# Patient Record
Sex: MALE | Race: WHITE
[De-identification: names, ages, dates, MRNs, and addresses within clinical notes are randomized per-mention and may not be internally consistent; named-entity substitution may affect disease eponyms.]

---

## 2017-05-05 ENCOUNTER — HOSPITAL ENCOUNTER (EMERGENCY)
Dept: HOSPITAL 8 - ED | Age: 52
Discharge: HOME | End: 2017-05-05
Payer: COMMERCIAL

## 2017-05-05 VITALS — HEIGHT: 68 IN | BODY MASS INDEX: 35.52 KG/M2 | WEIGHT: 234.35 LBS

## 2017-05-05 VITALS — DIASTOLIC BLOOD PRESSURE: 84 MMHG | SYSTOLIC BLOOD PRESSURE: 132 MMHG

## 2017-05-05 DIAGNOSIS — R10.9: ICD-10-CM

## 2017-05-05 DIAGNOSIS — I10: ICD-10-CM

## 2017-05-05 DIAGNOSIS — N20.1: Primary | ICD-10-CM

## 2017-05-05 LAB — BUN SERPL-MCNC: 15 MG/DL (ref 7–18)

## 2017-05-05 PROCEDURE — 99285 EMERGENCY DEPT VISIT HI MDM: CPT

## 2017-05-05 PROCEDURE — 96374 THER/PROPH/DIAG INJ IV PUSH: CPT

## 2017-05-05 PROCEDURE — 85025 COMPLETE CBC W/AUTO DIFF WBC: CPT

## 2017-05-05 PROCEDURE — 82040 ASSAY OF SERUM ALBUMIN: CPT

## 2017-05-05 PROCEDURE — 96375 TX/PRO/DX INJ NEW DRUG ADDON: CPT

## 2017-05-05 PROCEDURE — 36415 COLL VENOUS BLD VENIPUNCTURE: CPT

## 2017-05-05 PROCEDURE — 74176 CT ABD & PELVIS W/O CONTRAST: CPT

## 2017-05-05 PROCEDURE — 80048 BASIC METABOLIC PNL TOTAL CA: CPT

## 2017-05-05 PROCEDURE — 96372 THER/PROPH/DIAG INJ SC/IM: CPT

## 2017-05-05 PROCEDURE — 81001 URINALYSIS AUTO W/SCOPE: CPT

## 2020-01-15 ENCOUNTER — HOSPITAL ENCOUNTER (OUTPATIENT)
Dept: HOSPITAL 8 - RAD | Age: 55
Discharge: HOME | End: 2020-01-15
Attending: FAMILY MEDICINE
Payer: COMMERCIAL

## 2020-01-15 DIAGNOSIS — K57.30: ICD-10-CM

## 2020-01-15 DIAGNOSIS — R10.84: ICD-10-CM

## 2020-01-15 DIAGNOSIS — M51.36: ICD-10-CM

## 2020-01-15 DIAGNOSIS — I70.0: ICD-10-CM

## 2020-01-15 DIAGNOSIS — K80.20: ICD-10-CM

## 2020-01-15 DIAGNOSIS — K42.9: Primary | ICD-10-CM

## 2020-01-15 DIAGNOSIS — K44.9: ICD-10-CM

## 2020-01-15 DIAGNOSIS — K76.0: ICD-10-CM

## 2020-01-15 LAB — CREAT SERPL-MCNC: 1.04 MG/DL (ref 0.7–1.3)

## 2020-01-15 PROCEDURE — 82565 ASSAY OF CREATININE: CPT

## 2020-01-15 PROCEDURE — 36415 COLL VENOUS BLD VENIPUNCTURE: CPT

## 2020-01-15 PROCEDURE — 74177 CT ABD & PELVIS W/CONTRAST: CPT

## 2021-06-01 ENCOUNTER — HOSPITAL ENCOUNTER (EMERGENCY)
Dept: HOSPITAL 8 - ED | Age: 56
Discharge: HOME | End: 2021-06-01
Payer: COMMERCIAL

## 2021-06-01 VITALS — BODY MASS INDEX: 34.35 KG/M2 | HEIGHT: 68 IN | WEIGHT: 226.64 LBS

## 2021-06-01 VITALS — SYSTOLIC BLOOD PRESSURE: 124 MMHG | DIASTOLIC BLOOD PRESSURE: 77 MMHG

## 2021-06-01 DIAGNOSIS — T18.128A: Primary | ICD-10-CM
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
## 2021-06-01 PROCEDURE — 74220 X-RAY XM ESOPHAGUS 1CNTRST: CPT

## 2021-06-01 PROCEDURE — 96374 THER/PROPH/DIAG INJ IV PUSH: CPT

## 2021-06-01 PROCEDURE — C9113 INJ PANTOPRAZOLE SODIUM, VIA: HCPCS

## 2021-06-01 PROCEDURE — 96375 TX/PRO/DX INJ NEW DRUG ADDON: CPT

## 2021-06-01 PROCEDURE — 99285 EMERGENCY DEPT VISIT HI MDM: CPT

## 2021-06-01 PROCEDURE — 43247 EGD REMOVE FOREIGN BODY: CPT

## 2021-06-01 NOTE — NUR
PT C/O OF DIFFICULTY SWALLOWING AROUND 1530 TODAY. PT STATES HE FEELS LIKE FOOD 
IS CAUGHT IN HIS THROAT AND HAS NOT BEEN ABLE TO SWALLOW ANYTHING SINCE. 

PT CONNECTED TO CARD/SP02/BP MONITORS. VSS. PT IN NAD.

BED IN LOW POSITION. RAILS ENGAGED. CALL LIGHT WITHIN REACH.

NO ADDITIONAL QUESTIONS OR NEED AT THIS TIME.

## 2022-12-30 ENCOUNTER — APPOINTMENT (OUTPATIENT)
Dept: CARDIOLOGY | Facility: MEDICAL CENTER | Age: 57
End: 2022-12-30
Attending: INTERNAL MEDICINE
Payer: COMMERCIAL

## 2022-12-30 ENCOUNTER — APPOINTMENT (OUTPATIENT)
Dept: RADIOLOGY | Facility: MEDICAL CENTER | Age: 57
End: 2022-12-30
Attending: EMERGENCY MEDICINE
Payer: COMMERCIAL

## 2022-12-30 ENCOUNTER — HOSPITAL ENCOUNTER (OUTPATIENT)
Facility: MEDICAL CENTER | Age: 57
End: 2022-12-31
Attending: EMERGENCY MEDICINE | Admitting: INTERNAL MEDICINE
Payer: COMMERCIAL

## 2022-12-30 DIAGNOSIS — R07.9 CHEST PAIN, UNSPECIFIED TYPE: ICD-10-CM

## 2022-12-30 DIAGNOSIS — R07.9 ACUTE CHEST PAIN: ICD-10-CM

## 2022-12-30 PROBLEM — E66.09 CLASS 1 OBESITY DUE TO EXCESS CALORIES WITHOUT SERIOUS COMORBIDITY WITH BODY MASS INDEX (BMI) OF 33.0 TO 33.9 IN ADULT: Status: ACTIVE | Noted: 2022-12-30

## 2022-12-30 PROBLEM — E03.8 OTHER SPECIFIED HYPOTHYROIDISM: Status: ACTIVE | Noted: 2022-12-30

## 2022-12-30 PROBLEM — I10 PRIMARY HYPERTENSION: Status: ACTIVE | Noted: 2022-12-30

## 2022-12-30 PROBLEM — R07.89 OTHER CHEST PAIN: Status: ACTIVE | Noted: 2022-12-30

## 2022-12-30 PROBLEM — E11.65 TYPE 2 DIABETES MELLITUS WITH HYPERGLYCEMIA, WITHOUT LONG-TERM CURRENT USE OF INSULIN (HCC): Status: ACTIVE | Noted: 2022-12-30

## 2022-12-30 LAB
ALBUMIN SERPL BCP-MCNC: 4.5 G/DL (ref 3.2–4.9)
ALBUMIN/GLOB SERPL: 1.6 G/DL
ALP SERPL-CCNC: 56 U/L (ref 30–99)
ALT SERPL-CCNC: 19 U/L (ref 2–50)
ANION GAP SERPL CALC-SCNC: 13 MMOL/L (ref 7–16)
AST SERPL-CCNC: 21 U/L (ref 12–45)
BASOPHILS # BLD AUTO: 0.6 % (ref 0–1.8)
BASOPHILS # BLD: 0.05 K/UL (ref 0–0.12)
BILIRUB SERPL-MCNC: 0.8 MG/DL (ref 0.1–1.5)
BUN SERPL-MCNC: 9 MG/DL (ref 8–22)
CALCIUM ALBUM COR SERPL-MCNC: 9 MG/DL (ref 8.5–10.5)
CALCIUM SERPL-MCNC: 9.4 MG/DL (ref 8.5–10.5)
CHLORIDE SERPL-SCNC: 104 MMOL/L (ref 96–112)
CO2 SERPL-SCNC: 21 MMOL/L (ref 20–33)
CREAT SERPL-MCNC: 0.71 MG/DL (ref 0.5–1.4)
D DIMER PPP IA.FEU-MCNC: 0.37 UG/ML (FEU) (ref 0–0.5)
EKG IMPRESSION: NORMAL
EOSINOPHIL # BLD AUTO: 0.26 K/UL (ref 0–0.51)
EOSINOPHIL NFR BLD: 2.9 % (ref 0–6.9)
ERYTHROCYTE [DISTWIDTH] IN BLOOD BY AUTOMATED COUNT: 37.8 FL (ref 35.9–50)
EST. AVERAGE GLUCOSE BLD GHB EST-MCNC: 163 MG/DL
GFR SERPLBLD CREATININE-BSD FMLA CKD-EPI: 105 ML/MIN/1.73 M 2
GLOBULIN SER CALC-MCNC: 2.8 G/DL (ref 1.9–3.5)
GLUCOSE BLD STRIP.AUTO-MCNC: 119 MG/DL (ref 65–99)
GLUCOSE BLD STRIP.AUTO-MCNC: 153 MG/DL (ref 65–99)
GLUCOSE BLD STRIP.AUTO-MCNC: 193 MG/DL (ref 65–99)
GLUCOSE SERPL-MCNC: 171 MG/DL (ref 65–99)
HBA1C MFR BLD: 7.3 % (ref 4–5.6)
HCT VFR BLD AUTO: 46.4 % (ref 42–52)
HGB BLD-MCNC: 17.1 G/DL (ref 14–18)
IMM GRANULOCYTES # BLD AUTO: 0.08 K/UL (ref 0–0.11)
IMM GRANULOCYTES NFR BLD AUTO: 0.9 % (ref 0–0.9)
LV EJECT FRACT  99904: 60
LV EJECT FRACT MOD 2C 99903: 48.62
LV EJECT FRACT MOD 4C 99902: 61.1
LV EJECT FRACT MOD BP 99901: 55.45
LYMPHOCYTES # BLD AUTO: 1.98 K/UL (ref 1–4.8)
LYMPHOCYTES NFR BLD: 22 % (ref 22–41)
MCH RBC QN AUTO: 30.8 PG (ref 27–33)
MCHC RBC AUTO-ENTMCNC: 36.9 G/DL (ref 33.7–35.3)
MCV RBC AUTO: 83.5 FL (ref 81.4–97.8)
MONOCYTES # BLD AUTO: 0.89 K/UL (ref 0–0.85)
MONOCYTES NFR BLD AUTO: 9.9 % (ref 0–13.4)
NEUTROPHILS # BLD AUTO: 5.76 K/UL (ref 1.82–7.42)
NEUTROPHILS NFR BLD: 63.7 % (ref 44–72)
NRBC # BLD AUTO: 0 K/UL
NRBC BLD-RTO: 0 /100 WBC
PLATELET # BLD AUTO: 283 K/UL (ref 164–446)
PMV BLD AUTO: 10.3 FL (ref 9–12.9)
POTASSIUM SERPL-SCNC: 3.9 MMOL/L (ref 3.6–5.5)
PROT SERPL-MCNC: 7.3 G/DL (ref 6–8.2)
RBC # BLD AUTO: 5.56 M/UL (ref 4.7–6.1)
SODIUM SERPL-SCNC: 138 MMOL/L (ref 135–145)
TROPONIN T SERPL-MCNC: 7 NG/L (ref 6–19)
TROPONIN T SERPL-MCNC: <6 NG/L (ref 6–19)
WBC # BLD AUTO: 9 K/UL (ref 4.8–10.8)

## 2022-12-30 PROCEDURE — 85379 FIBRIN DEGRADATION QUANT: CPT

## 2022-12-30 PROCEDURE — 93005 ELECTROCARDIOGRAM TRACING: CPT | Performed by: EMERGENCY MEDICINE

## 2022-12-30 PROCEDURE — 83036 HEMOGLOBIN GLYCOSYLATED A1C: CPT

## 2022-12-30 PROCEDURE — 85025 COMPLETE CBC W/AUTO DIFF WBC: CPT

## 2022-12-30 PROCEDURE — 700102 HCHG RX REV CODE 250 W/ 637 OVERRIDE(OP): Performed by: INTERNAL MEDICINE

## 2022-12-30 PROCEDURE — 99220 PR INITIAL OBSERVATION CARE,LEVL III: CPT | Performed by: INTERNAL MEDICINE

## 2022-12-30 PROCEDURE — A9270 NON-COVERED ITEM OR SERVICE: HCPCS | Performed by: INTERNAL MEDICINE

## 2022-12-30 PROCEDURE — 36415 COLL VENOUS BLD VENIPUNCTURE: CPT

## 2022-12-30 PROCEDURE — G0378 HOSPITAL OBSERVATION PER HR: HCPCS

## 2022-12-30 PROCEDURE — 80053 COMPREHEN METABOLIC PANEL: CPT

## 2022-12-30 PROCEDURE — 82962 GLUCOSE BLOOD TEST: CPT

## 2022-12-30 PROCEDURE — 84484 ASSAY OF TROPONIN QUANT: CPT

## 2022-12-30 PROCEDURE — 96372 THER/PROPH/DIAG INJ SC/IM: CPT

## 2022-12-30 PROCEDURE — 93005 ELECTROCARDIOGRAM TRACING: CPT

## 2022-12-30 PROCEDURE — 700111 HCHG RX REV CODE 636 W/ 250 OVERRIDE (IP): Performed by: INTERNAL MEDICINE

## 2022-12-30 PROCEDURE — 71045 X-RAY EXAM CHEST 1 VIEW: CPT

## 2022-12-30 PROCEDURE — 99285 EMERGENCY DEPT VISIT HI MDM: CPT

## 2022-12-30 PROCEDURE — 93306 TTE W/DOPPLER COMPLETE: CPT

## 2022-12-30 PROCEDURE — 93306 TTE W/DOPPLER COMPLETE: CPT | Mod: 26 | Performed by: INTERNAL MEDICINE

## 2022-12-30 RX ORDER — AMOXICILLIN 250 MG
2 CAPSULE ORAL 2 TIMES DAILY
Status: DISCONTINUED | OUTPATIENT
Start: 2022-12-30 | End: 2022-12-31 | Stop reason: HOSPADM

## 2022-12-30 RX ORDER — POLYETHYLENE GLYCOL 3350 17 G/17G
1 POWDER, FOR SOLUTION ORAL
Status: DISCONTINUED | OUTPATIENT
Start: 2022-12-30 | End: 2022-12-31 | Stop reason: HOSPADM

## 2022-12-30 RX ORDER — ACETAMINOPHEN 325 MG/1
650 TABLET ORAL EVERY 6 HOURS PRN
Status: DISCONTINUED | OUTPATIENT
Start: 2022-12-30 | End: 2022-12-31 | Stop reason: HOSPADM

## 2022-12-30 RX ORDER — IBUPROFEN 200 MG
800 TABLET ORAL
COMMUNITY

## 2022-12-30 RX ORDER — ASPIRIN 300 MG/1
300 SUPPOSITORY RECTAL DAILY
Status: DISCONTINUED | OUTPATIENT
Start: 2022-12-31 | End: 2022-12-31 | Stop reason: HOSPADM

## 2022-12-30 RX ORDER — LEVOTHYROXINE SODIUM 0.07 MG/1
75 TABLET ORAL DAILY
Status: DISCONTINUED | OUTPATIENT
Start: 2022-12-30 | End: 2022-12-31 | Stop reason: HOSPADM

## 2022-12-30 RX ORDER — BISACODYL 10 MG
10 SUPPOSITORY, RECTAL RECTAL
Status: DISCONTINUED | OUTPATIENT
Start: 2022-12-30 | End: 2022-12-31 | Stop reason: HOSPADM

## 2022-12-30 RX ORDER — ENOXAPARIN SODIUM 100 MG/ML
40 INJECTION SUBCUTANEOUS DAILY
Status: DISCONTINUED | OUTPATIENT
Start: 2022-12-30 | End: 2022-12-31 | Stop reason: HOSPADM

## 2022-12-30 RX ORDER — LISINOPRIL 20 MG/1
20 TABLET ORAL EVERY EVENING
Status: DISCONTINUED | OUTPATIENT
Start: 2022-12-30 | End: 2022-12-31 | Stop reason: HOSPADM

## 2022-12-30 RX ORDER — ASPIRIN 325 MG
325 TABLET ORAL DAILY
Status: DISCONTINUED | OUTPATIENT
Start: 2022-12-31 | End: 2022-12-31 | Stop reason: HOSPADM

## 2022-12-30 RX ORDER — ASPIRIN 81 MG/1
324 TABLET, CHEWABLE ORAL DAILY
Status: DISCONTINUED | OUTPATIENT
Start: 2022-12-31 | End: 2022-12-31 | Stop reason: HOSPADM

## 2022-12-30 RX ADMIN — ENOXAPARIN SODIUM 40 MG: 40 INJECTION SUBCUTANEOUS at 17:13

## 2022-12-30 RX ADMIN — METFORMIN HYDROCHLORIDE 500 MG: 500 TABLET ORAL at 17:12

## 2022-12-30 RX ADMIN — LEVOTHYROXINE SODIUM 75 MCG: 0.07 TABLET ORAL at 17:13

## 2022-12-30 ASSESSMENT — ENCOUNTER SYMPTOMS
FOCAL WEAKNESS: 0
ABDOMINAL PAIN: 0
BLOOD IN STOOL: 0
HEADACHES: 0
TREMORS: 0
VOMITING: 0
CHILLS: 0
LOSS OF CONSCIOUSNESS: 0
EYE REDNESS: 0
NAUSEA: 0
HEMOPTYSIS: 0
WEAKNESS: 0
NERVOUS/ANXIOUS: 0
PALPITATIONS: 0
FEVER: 0
CONSTIPATION: 0
MYALGIAS: 0
SEIZURES: 0
SHORTNESS OF BREATH: 1
WHEEZING: 0
DIARRHEA: 0
INSOMNIA: 0
DIZZINESS: 0
FALLS: 0
COUGH: 0
DIAPHORESIS: 1
EYE PAIN: 0

## 2022-12-30 ASSESSMENT — HEART SCORE
HISTORY: MODERATELY SUSPICIOUS
AGE: 45-64
ECG: NORMAL
RISK FACTORS: >2 RISK FACTORS OR HX OF ATHEROSCLEROTIC DISEASE
TROPONIN: LESS THAN OR EQUAL TO NORMAL LIMIT
HEART SCORE: 4

## 2022-12-30 ASSESSMENT — LIFESTYLE VARIABLES
TOTAL SCORE: 0
AVERAGE NUMBER OF DAYS PER WEEK YOU HAVE A DRINK CONTAINING ALCOHOL: 0
HOW MANY TIMES IN THE PAST YEAR HAVE YOU HAD 5 OR MORE DRINKS IN A DAY: 0
CONSUMPTION TOTAL: NEGATIVE
EVER HAD A DRINK FIRST THING IN THE MORNING TO STEADY YOUR NERVES TO GET RID OF A HANGOVER: NO
ALCOHOL_USE: NO
HAVE YOU EVER FELT YOU SHOULD CUT DOWN ON YOUR DRINKING: NO
HAVE PEOPLE ANNOYED YOU BY CRITICIZING YOUR DRINKING: NO
TOTAL SCORE: 0
TOTAL SCORE: 0
HAVE YOU EVER FELT YOU SHOULD CUT DOWN ON YOUR DRINKING: NO
TOTAL SCORE: 0
ON A TYPICAL DAY WHEN YOU DRINK ALCOHOL HOW MANY DRINKS DO YOU HAVE: 0
CONSUMPTION TOTAL: NEGATIVE
DO YOU DRINK ALCOHOL: NO
DOES PATIENT WANT TO STOP DRINKING: NO
HAVE PEOPLE ANNOYED YOU BY CRITICIZING YOUR DRINKING: NO
TOTAL SCORE: 0
EVER FELT BAD OR GUILTY ABOUT YOUR DRINKING: NO
EVER FELT BAD OR GUILTY ABOUT YOUR DRINKING: NO
AVERAGE NUMBER OF DAYS PER WEEK YOU HAVE A DRINK CONTAINING ALCOHOL: 0
EVER HAD A DRINK FIRST THING IN THE MORNING TO STEADY YOUR NERVES TO GET RID OF A HANGOVER: NO
HOW MANY TIMES IN THE PAST YEAR HAVE YOU HAD 5 OR MORE DRINKS IN A DAY: 0
ON A TYPICAL DAY WHEN YOU DRINK ALCOHOL HOW MANY DRINKS DO YOU HAVE: 0
TOTAL SCORE: 0

## 2022-12-30 ASSESSMENT — PATIENT HEALTH QUESTIONNAIRE - PHQ9
SUM OF ALL RESPONSES TO PHQ9 QUESTIONS 1 AND 2: 0
1. LITTLE INTEREST OR PLEASURE IN DOING THINGS: NOT AT ALL
2. FEELING DOWN, DEPRESSED, IRRITABLE, OR HOPELESS: NOT AT ALL

## 2022-12-30 ASSESSMENT — PAIN DESCRIPTION - PAIN TYPE
TYPE: ACUTE PAIN
TYPE: ACUTE PAIN

## 2022-12-30 ASSESSMENT — FIBROSIS 4 INDEX: FIB4 SCORE: 1

## 2022-12-30 NOTE — PROGRESS NOTES
Report received from ER RN. Assume care. Pt. AAOx4 pt is bed,  Assessment completed. VSS. Denies Chest pain only dull discomfort reported Pt refuses any medication for it at this time.  able to wiggle toes and dorsi/plantar flex feet, good CMS and pulses to shanell LE, denies numbness and tingling.  Pt was update for the care for the day. White board updated, All question answered. Pt has call light within reach,  bed is in the lowest position. Pt has no other needs at this time.      Stress test to be done tomorrow earlier in the AM

## 2022-12-30 NOTE — ED PROVIDER NOTES
"ED Provider Note    CHIEF COMPLAINT  Chief Complaint   Patient presents with    Chest Pain     Intermittent chest pain x 1 hour. Endorses pain as \"sharp\" and \"stabbing.\" Endorses radiation of pain to left side of head and neck. Given 324 Aspirin with EMS. Endorses hx of HTN. Patient also endorses generalized weakness.        HPI  Gregory Ryan is a 57 y.o. male who presents with complaint of chest pain.  His left anterior chest radiating to the left shoulder, left upper arm, left face and posterior scalp.  Patient describes pain as \"sharp and shooting\".  Severity of the pain is moderate, lasting 2 hours.  Is now described as very mild pressure left anterior chest, stating he feels much more comfortable beginning about 10 minutes prior to interview.  Associated symptoms included shortness of breath, dizziness, sweats, no nausea.  Cardiac risk factors include tobacco use, family history, diabetes and hypertension.  He denies history of heart disease.  No exacerbating factors.  No history of DVT or pulmonary embolism.  Time of onset, patient was just beginning to drink a cup of coffee, no exertional component.    REVIEW OF SYSTEMS    Constitutional: No fever.  Dizziness now resolved  Respiratory: Shortness of breath now resolved.  Cardiac: Chest pain  Gastrointestinal: No abdominal pain  Musculoskeletal: Radiation of pain to the left shoulder  Neurologic: Radiation pain to the left face and posterior scalp  Skin: No edema     All other systems are negative.       PAST MEDICAL HISTORY  Past Medical History:   Diagnosis Date    Diabetes     Hypertension     Hypothyroid        FAMILY HISTORY  No family history on file.    SOCIAL HISTORY  Social History     Socioeconomic History    Marital status: Single   Tobacco Use    Smoking status: Every Day     Packs/day: 0.25     Years: 8.00     Pack years: 2.00     Types: Cigarettes   Substance and Sexual Activity    Alcohol use: Yes     Comment: occ    Drug use: No " "      SURGICAL HISTORY  No past surgical history on file.    CURRENT MEDICATIONS  Home Medications       Reviewed by Cecelia Quezada R.N. (Registered Nurse) on 12/30/22 at 0734  Med List Status: Partial     Medication Last Dose Status   hydrocodone-acetaminophen (VICODIN) 5-500 MG TABS  Active   ibuprofen (MOTRIN) 800 MG TABS  Active   levothyroxine (SYNTHROID) 75 MCG TABS  Active   lisinopril (PRINIVIL) 20 MG TABS  Active   metformin (GLUCOPHAGE) 500 MG TABS  Active                    ALLERGIES  No Known Allergies    PHYSICAL EXAM  VITAL SIGNS: /69   Pulse (!) 50   Temp 36 °C (96.8 °F) (Temporal)   Resp 16   Ht 1.753 m (5' 9\")   Wt 104 kg (230 lb)   SpO2 97%   BMI 33.97 kg/m²   Constitutional:  Non-toxic appearance.   HENT: No facial swelling  Eyes: Anicteric, no conjunctivitis.     Cardiovascular: Normal heart rate, Normal rhythm, no murmur  Pulmonary:  No wheezing, No rales.  Good bilateral air movement  Gastrointestinal: Soft, No tenderness, No masses  Skin: Warm, Dry, No cyanosis.  No peripheral edema  Neurologic: Speech is clear, follows commands, facial expression is symmetrical.  Psychiatric: Affect normal,  Mood normal.  Patient is calm and cooperative  Musculoskeletal: Some chest wall tenderness on the left    EKG/Labs  Results for orders placed or performed during the hospital encounter of 12/30/22   CBC with Differential   Result Value Ref Range    WBC 9.0 4.8 - 10.8 K/uL    RBC 5.56 4.70 - 6.10 M/uL    Hemoglobin 17.1 14.0 - 18.0 g/dL    Hematocrit 46.4 42.0 - 52.0 %    MCV 83.5 81.4 - 97.8 fL    MCH 30.8 27.0 - 33.0 pg    MCHC 36.9 (H) 33.7 - 35.3 g/dL    RDW 37.8 35.9 - 50.0 fL    Platelet Count 283 164 - 446 K/uL    MPV 10.3 9.0 - 12.9 fL    Neutrophils-Polys 63.70 44.00 - 72.00 %    Lymphocytes 22.00 22.00 - 41.00 %    Monocytes 9.90 0.00 - 13.40 %    Eosinophils 2.90 0.00 - 6.90 %    Basophils 0.60 0.00 - 1.80 %    Immature Granulocytes 0.90 0.00 - 0.90 %    Nucleated RBC 0.00 /100 WBC "    Neutrophils (Absolute) 5.76 1.82 - 7.42 K/uL    Lymphs (Absolute) 1.98 1.00 - 4.80 K/uL    Monos (Absolute) 0.89 (H) 0.00 - 0.85 K/uL    Eos (Absolute) 0.26 0.00 - 0.51 K/uL    Baso (Absolute) 0.05 0.00 - 0.12 K/uL    Immature Granulocytes (abs) 0.08 0.00 - 0.11 K/uL    NRBC (Absolute) 0.00 K/uL   Complete Metabolic Panel (CMP)   Result Value Ref Range    Sodium 138 135 - 145 mmol/L    Potassium 3.9 3.6 - 5.5 mmol/L    Chloride 104 96 - 112 mmol/L    Co2 21 20 - 33 mmol/L    Anion Gap 13.0 7.0 - 16.0    Glucose 171 (H) 65 - 99 mg/dL    Bun 9 8 - 22 mg/dL    Creatinine 0.71 0.50 - 1.40 mg/dL    Calcium 9.4 8.5 - 10.5 mg/dL    AST(SGOT) 21 12 - 45 U/L    ALT(SGPT) 19 2 - 50 U/L    Alkaline Phosphatase 56 30 - 99 U/L    Total Bilirubin 0.8 0.1 - 1.5 mg/dL    Albumin 4.5 3.2 - 4.9 g/dL    Total Protein 7.3 6.0 - 8.2 g/dL    Globulin 2.8 1.9 - 3.5 g/dL    A-G Ratio 1.6 g/dL   Troponins NOW   Result Value Ref Range    Troponin T 7 6 - 19 ng/L   CORRECTED CALCIUM   Result Value Ref Range    Correct Calcium 9.0 8.5 - 10.5 mg/dL   ESTIMATED GFR   Result Value Ref Range    GFR (CKD-EPI) 105 >60 mL/min/1.73 m 2   EKG (NOW)   Result Value Ref Range    Report       [facility] Emergency Dept.    Test Date:  2022  Pt Name:    JAMEL CAPUTO             Department: ER  MRN:        [id number]                      Room:  Gender:     Male                         Technician: [id number]  :        1965                   Requested By:ER TRIAGE PROTOCOL  Order #:    [id number]                    Reading MD: LEONEL GARCÍA MD    Measurements  Intervals                                Axis  Rate:       66                           P:          38  MN:         144                          QRS:        63  QRSD:       92                           T:          49  QT:         389  QTc:        408    Interpretive Statements  Sinus rhythm  Supraventricular bigeminy  No previous ECG available for  comparison  Electronically Signed On 12- 8:18:11 PST by CHOLO GARCÍA MD           RADIOLOGY/PROCEDURES  DX-CHEST-PORTABLE (1 VIEW)   Final Result      No acute cardiopulmonary disease evident.            COURSE & MEDICAL DECISION MAKING  Pertinent Labs & Imaging studies reviewed. (See chart for details)  Patient presents with chest pain, differential diagnosis including chest wall pain, radicular pain, cardiac ischemia, pleurisy, esophagitis  Patient presents with left-sided chest pain, no similar episodes in the past.  He has multiple cardiac risk factors, his heart score is 4, moderate risk.  EKG is negative for ischemia, initial troponin is normal.  No evidence of anemia, no evidence of infection.  Plan for hospitalization, work-up for cardiac etiologies of his chest discomfort.  Patient appears comfortable at this time.    FINAL IMPRESSION     1. Acute chest pain                        Electronically signed by: Cholo García M.D., 12/30/2022 8:18 AM

## 2022-12-30 NOTE — PROGRESS NOTES
4 Eyes Skin Assessment Completed by SHAMIR Hampton and SHAMIR Yates.    Head WDL  Ears WDL  Nose WDL  Mouth WDL  Neck WDL  Breast/Chest WDL  Shoulder Blades WDL  Spine WDL  (R) Arm/Elbow/Hand WDL  (L) Arm/Elbow/Hand WDL  Abdomen WDL  Groin WDL  Scrotum/Coccyx/Buttocks WDL  (R) Leg WDL  (L) Leg WDL  (R) Heel/Foot/Toe WDL  (L) Heel/Foot/Toe WDL          Devices In Places Tele Box and Pulse Ox      Interventions In Place Pillows    Possible Skin Injury No    Pictures Uploaded Into Epic N/A  Wound Consult Placed N/A  RN Wound Prevention Protocol Ordered No   Berta

## 2022-12-30 NOTE — CARE PLAN
The patient is Stable - Low risk of patient condition declining or worsening    Shift Goals  Clinical Goals: BS to be WNL  Patient Goals: rest    Progress made toward(s) clinical / shift goals:  Yes Pt . Remain hemodynamically stable    Patient is not progressing towards the following goals:

## 2022-12-30 NOTE — ED TRIAGE NOTES
"Chief Complaint   Patient presents with    Chest Pain     Intermittent chest pain x 1 hour. Endorses pain as \"sharp\" and \"stabbing.\" Endorses radiation of pain to left side of head and neck. Given 324 Aspirin with EMS. Endorses hx of HTN. Patient also endorses generalized weakness.        "

## 2022-12-30 NOTE — ED NOTES
Pt resting in bed, VSS on RA, GCS 15, NAD, pt states improvement in chest pain, pt aware POC to admit, waiting on inpatient bed

## 2022-12-30 NOTE — ED NOTES
Report called to Berta BARKSDALE, coming to bedside for transport, pt resting in bed, VSS on RA, GCS 15, NAD, aware POC to admit now

## 2022-12-30 NOTE — ASSESSMENT & PLAN NOTE
Vitals:    12/30/22 0801   BP: 102/64   Pulse: 69   Resp:    Temp:    SpO2: 93%     Stable on lisinopril

## 2022-12-30 NOTE — H&P
"Hospital Medicine History & Physical Note    Date of Service  12/30/2022    Primary Care Physician  Pcp Not In Computer    Consultants    Code Status  Full Code    Chief Complaint  Chief Complaint   Patient presents with    Chest Pain     Intermittent chest pain x 1 hour. Endorses pain as \"sharp\" and \"stabbing.\" Endorses radiation of pain to left side of head and neck. Given 324 Aspirin with EMS. Endorses hx of HTN. Patient also endorses generalized weakness.        History of Presenting Illness  Gregory Ryan is a 57 y.o. male who presented 12/30/2022 with Chest Pain (Intermittent chest pain x 1 hour. Endorses pain as \"sharp\" and \"stabbing.\" Endorses radiation of pain to left side of head and neck. Given 324 Aspirin with EMS. Endorses hx of HTN. Patient also endorses generalized weakness. )  He is obese. He has a history of bad back hypertension, diabetes, hypothyroidism. He woke up today getting ready for work when he developed sharp stabbing chest pain 10/10 radiating to L side, head and neck accompanied by shortness of breath, diaphoresis and clamminess. Occurred for 1-2 hours then subsided at the ED. He has a family history of heart disease.  At the ED, he is afebrile, normotensive.  CXR looks clear on my review  EKG on my review is sinus  Troponin negative  When I saw him at , he is in no acute distress. Wife at bedside. Murmur on auscultation.  He prefers a chemical stress test because he has a \"bad back\".  I discussed the plan of care with patient, family, and bedside RN.    Review of Systems  Review of Systems   Constitutional:  Positive for diaphoresis. Negative for chills and fever.   HENT:  Negative for congestion, hearing loss and nosebleeds.    Eyes:  Negative for pain and redness.   Respiratory:  Positive for shortness of breath. Negative for cough, hemoptysis and wheezing.    Cardiovascular:  Positive for chest pain. Negative for palpitations.   Gastrointestinal:  Negative for abdominal pain, " blood in stool, constipation, diarrhea, nausea and vomiting.   Genitourinary:  Negative for dysuria, frequency and hematuria.   Musculoskeletal:  Negative for falls, joint pain and myalgias.   Skin:  Negative for rash.   Neurological:  Negative for dizziness, tremors, focal weakness, seizures, loss of consciousness, weakness and headaches.   Psychiatric/Behavioral:  The patient is not nervous/anxious and does not have insomnia.    All other systems reviewed and are negative.    Past Medical History   has a past medical history of Diabetes, Hypertension, and Hypothyroid.    Surgical History   has no past surgical history on file.     Family History  family history is not on file.   Family history reviewed with patient. There is family history that is pertinent to the chief complaint.     Social History   reports that he has been smoking cigarettes. He has a 2.00 pack-year smoking history. He does not have any smokeless tobacco history on file. He reports current alcohol use. He reports that he does not use drugs.    Allergies  No Known Allergies    Medications  Prior to Admission Medications   Prescriptions Last Dose Informant Patient Reported? Taking?   ibuprofen (MOTRIN) 200 MG Tab 12/29/2022 at AFTERNOON Patient Yes Yes   Sig: Take 800 mg by mouth 1 time a day as needed (Pain).   levothyroxine (SYNTHROID) 75 MCG TABS 12/29/2022 at PM Patient Yes No   Sig: Take 75 mcg by mouth every day.   lisinopril (PRINIVIL) 20 MG TABS 12/29/2022 at PM Patient Yes No   Sig: Take 20 mg by mouth every day.   metformin (GLUCOPHAGE) 500 MG TABS FEW WEEKS AGO at [facility] Patient Yes No   Sig: Take 500 mg by mouth every day.      Facility-Administered Medications: None       Physical Exam  Temp:  [36 °C (96.8 °F)] 36 °C (96.8 °F)  Pulse:  [50-75] 69  Resp:  [16] 16  BP: (102-120)/(62-69) 102/64  SpO2:  [93 %-97 %] 93 %  Blood Pressure: 102/64   Temperature: 36 °C (96.8 °F)   Pulse: 69   Respiration: 16   Pulse Oximetry: 93 %       Physical  Exam  Vitals and nursing note reviewed.   Constitutional:       Appearance: He is obese.   HENT:      Head: Normocephalic and atraumatic.      Right Ear: External ear normal.      Left Ear: External ear normal.      Nose: Nose normal.      Mouth/Throat:      Mouth: Mucous membranes are moist.   Eyes:      General: No scleral icterus.     Conjunctiva/sclera: Conjunctivae normal.   Cardiovascular:      Rate and Rhythm: Normal rate and regular rhythm.      Heart sounds: Murmur heard.     No friction rub. No gallop.   Pulmonary:      Effort: Pulmonary effort is normal.      Breath sounds: Normal breath sounds.   Abdominal:      General: Abdomen is flat. Bowel sounds are normal. There is no distension.      Palpations: Abdomen is soft.      Tenderness: There is no abdominal tenderness. There is no guarding.   Musculoskeletal:         General: Normal range of motion.      Cervical back: Normal range of motion and neck supple.   Skin:     General: Skin is warm.   Neurological:      Mental Status: He is alert and oriented to person, place, and time. Mental status is at baseline.   Psychiatric:         Mood and Affect: Mood normal.         Behavior: Behavior normal.         Thought Content: Thought content normal.         Judgment: Judgment normal.      Comments: Slightly anxious       Laboratory:  Recent Labs     12/30/22  0742   WBC 9.0   RBC 5.56   HEMOGLOBIN 17.1   HEMATOCRIT 46.4   MCV 83.5   MCH 30.8   MCHC 36.9*   RDW 37.8   PLATELETCT 283   MPV 10.3     Recent Labs     12/30/22  0742   SODIUM 138   POTASSIUM 3.9   CHLORIDE 104   CO2 21   GLUCOSE 171*   BUN 9   CREATININE 0.71   CALCIUM 9.4     Recent Labs     12/30/22  0742   ALTSGPT 19   ASTSGOT 21   ALKPHOSPHAT 56   TBILIRUBIN 0.8   GLUCOSE 171*         No results for input(s): NTPROBNP in the last 72 hours.      Recent Labs     12/30/22  0742   TROPONINT 7       Imaging:  DX-CHEST-PORTABLE (1 VIEW)   Final Result      No acute cardiopulmonary disease evident.           Assessment/Plan:    * Other chest pain  Assessment & Plan  The ASCVD Risk score (Justin BACH, et al., 2019) failed to calculate for the following reasons:    Cannot find a previous HDL lab    Cannot find a previous total cholesterol lab   Admit to OBSERVATION to evaluate chest pain  Telemetry, trend troponins, ASA, ordered lipid panel, A1c.  NM chemical stress test  Echo bec of murmur              Type 2 diabetes mellitus with hyperglycemia, without long-term current use of insulin (HCC)  Assessment & Plan  A1c ordered  Metformin an SS insulin    Other specified hypothyroidism  Assessment & Plan  Continue Synthroid    Class 1 obesity due to excess calories without serious comorbidity with body mass index (BMI) of 33.0 to 33.9 in adult  Assessment & Plan  Recommended weight loss advised, 5% through reduced calorie, low carb diet and 150 mins of exercise a week once better  Recommend bariatric surgery evaluation if morbidly obese  Educated on the increase of morbidity and mortality associated with excess weight including DM, Heart Disease, HTN, stroke, and sleep apnea. Recommended outpatient monitoring  of blood sugars, lipid panel, sleep study evaluation for metabolic syndrome.      Primary hypertension  Assessment & Plan  Vitals:    12/30/22 0801   BP: 102/64   Pulse: 69   Resp:    Temp:    SpO2: 93%     Stable on lisinopril        VTE prophylaxis: enoxaparin ppx

## 2022-12-30 NOTE — ED NOTES
Pt to yellow pod by WC, agree with triage RN note, pt states intermittent chest pain midsternal radiating to L side of neck and head, denies hx of chest pain, states hx of GERD but hasn't had that in a long time and this feels different, up for ERP to see, NAD, VSS on RA

## 2022-12-30 NOTE — CARE PLAN
Problem: Knowledge Deficit - Standard  Goal: Patient and family/care givers will demonstrate understanding of plan of care, disease process/condition, diagnostic tests and medications  Outcome: Progressing     Problem: Hemodynamics  Goal: Patient's hemodynamics, fluid balance and neurologic status will be stable or improve  Outcome: Progressing     Problem: Dysphagia  Goal: Dysphagia will improve  Outcome: Progressing   The patient is Stable - Low risk of patient condition declining or worsening    Shift Goals  Clinical Goals: BS to be WNL  Patient Goals: rest    Progress made toward(s) clinical / shift goals:  Yes BS are WNL, no chest pain reported only dull discomfort-declines meds    Patient is not progressing towards the following goals:

## 2022-12-30 NOTE — ASSESSMENT & PLAN NOTE
The ASCVD Risk score (Justin BACH, et al., 2019) failed to calculate for the following reasons:    Cannot find a previous HDL lab    Cannot find a previous total cholesterol lab   Admit to OBSERVATION to evaluate chest pain  Telemetry, trend troponins, ASA, ordered lipid panel, A1c.  NM chemical stress test  Echo bec of murmur

## 2022-12-31 ENCOUNTER — PHARMACY VISIT (OUTPATIENT)
Dept: PHARMACY | Facility: MEDICAL CENTER | Age: 57
End: 2022-12-31
Payer: COMMERCIAL

## 2022-12-31 ENCOUNTER — APPOINTMENT (OUTPATIENT)
Dept: RADIOLOGY | Facility: MEDICAL CENTER | Age: 57
End: 2022-12-31
Attending: INTERNAL MEDICINE
Payer: COMMERCIAL

## 2022-12-31 VITALS
DIASTOLIC BLOOD PRESSURE: 80 MMHG | OXYGEN SATURATION: 94 % | RESPIRATION RATE: 18 BRPM | WEIGHT: 214.51 LBS | BODY MASS INDEX: 32.51 KG/M2 | HEIGHT: 68 IN | TEMPERATURE: 97.6 F | HEART RATE: 80 BPM | SYSTOLIC BLOOD PRESSURE: 106 MMHG

## 2022-12-31 PROBLEM — R07.89 OTHER CHEST PAIN: Status: RESOLVED | Noted: 2022-12-30 | Resolved: 2022-12-31

## 2022-12-31 PROBLEM — R07.9 CHEST PAIN: Status: RESOLVED | Noted: 2022-12-30 | Resolved: 2022-12-31

## 2022-12-31 LAB
ANION GAP SERPL CALC-SCNC: 11 MMOL/L (ref 7–16)
BUN SERPL-MCNC: 9 MG/DL (ref 8–22)
CALCIUM SERPL-MCNC: 9.2 MG/DL (ref 8.5–10.5)
CHLORIDE SERPL-SCNC: 104 MMOL/L (ref 96–112)
CHOLEST SERPL-MCNC: 197 MG/DL (ref 100–199)
CO2 SERPL-SCNC: 22 MMOL/L (ref 20–33)
CREAT SERPL-MCNC: 0.7 MG/DL (ref 0.5–1.4)
ERYTHROCYTE [DISTWIDTH] IN BLOOD BY AUTOMATED COUNT: 38.4 FL (ref 35.9–50)
GFR SERPLBLD CREATININE-BSD FMLA CKD-EPI: 106 ML/MIN/1.73 M 2
GLUCOSE SERPL-MCNC: 120 MG/DL (ref 65–99)
HCT VFR BLD AUTO: 44 % (ref 42–52)
HDLC SERPL-MCNC: 32 MG/DL
HGB BLD-MCNC: 15.9 G/DL (ref 14–18)
LDLC SERPL CALC-MCNC: 103 MG/DL
MCH RBC QN AUTO: 30.8 PG (ref 27–33)
MCHC RBC AUTO-ENTMCNC: 36.1 G/DL (ref 33.7–35.3)
MCV RBC AUTO: 85.3 FL (ref 81.4–97.8)
PLATELET # BLD AUTO: 239 K/UL (ref 164–446)
PMV BLD AUTO: 10.2 FL (ref 9–12.9)
POTASSIUM SERPL-SCNC: 3.8 MMOL/L (ref 3.6–5.5)
RBC # BLD AUTO: 5.16 M/UL (ref 4.7–6.1)
SODIUM SERPL-SCNC: 137 MMOL/L (ref 135–145)
TRIGL SERPL-MCNC: 312 MG/DL (ref 0–149)
WBC # BLD AUTO: 7.9 K/UL (ref 4.8–10.8)

## 2022-12-31 PROCEDURE — 80048 BASIC METABOLIC PNL TOTAL CA: CPT

## 2022-12-31 PROCEDURE — 700111 HCHG RX REV CODE 636 W/ 250 OVERRIDE (IP)

## 2022-12-31 PROCEDURE — RXMED WILLOW AMBULATORY MEDICATION CHARGE: Performed by: HOSPITALIST

## 2022-12-31 PROCEDURE — G0378 HOSPITAL OBSERVATION PER HR: HCPCS

## 2022-12-31 PROCEDURE — 80061 LIPID PANEL: CPT

## 2022-12-31 PROCEDURE — 85027 COMPLETE CBC AUTOMATED: CPT

## 2022-12-31 PROCEDURE — 78452 HT MUSCLE IMAGE SPECT MULT: CPT

## 2022-12-31 PROCEDURE — 99217 PR OBSERVATION CARE DISCHARGE: CPT | Performed by: HOSPITALIST

## 2022-12-31 PROCEDURE — 700102 HCHG RX REV CODE 250 W/ 637 OVERRIDE(OP): Performed by: INTERNAL MEDICINE

## 2022-12-31 PROCEDURE — A9270 NON-COVERED ITEM OR SERVICE: HCPCS | Performed by: INTERNAL MEDICINE

## 2022-12-31 RX ORDER — ASPIRIN 81 MG/1
81 TABLET ORAL DAILY
Qty: 100 TABLET | Refills: 0 | Status: SHIPPED | OUTPATIENT
Start: 2022-12-31

## 2022-12-31 RX ORDER — NITROGLYCERIN 0.4 MG/1
0.4 TABLET SUBLINGUAL PRN
Qty: 25 TABLET | Refills: 3 | Status: SHIPPED | OUTPATIENT
Start: 2022-12-31

## 2022-12-31 RX ORDER — ATORVASTATIN CALCIUM 40 MG/1
40 TABLET, FILM COATED ORAL NIGHTLY
Qty: 30 TABLET | Refills: 3 | Status: SHIPPED | OUTPATIENT
Start: 2022-12-31 | End: 2023-06-02 | Stop reason: SDUPTHER

## 2022-12-31 RX ORDER — REGADENOSON 0.08 MG/ML
INJECTION, SOLUTION INTRAVENOUS
Status: COMPLETED
Start: 2022-12-31 | End: 2022-12-31

## 2022-12-31 RX ORDER — REGADENOSON 0.08 MG/ML
0.4 INJECTION, SOLUTION INTRAVENOUS ONCE
Status: COMPLETED | OUTPATIENT
Start: 2022-12-31 | End: 2022-12-31

## 2022-12-31 RX ADMIN — ASPIRIN 325 MG: 325 TABLET ORAL at 06:03

## 2022-12-31 RX ADMIN — REGADENOSON 0.4 MG: 0.08 INJECTION, SOLUTION INTRAVENOUS at 08:52

## 2022-12-31 ASSESSMENT — PAIN DESCRIPTION - PAIN TYPE: TYPE: ACUTE PAIN

## 2022-12-31 NOTE — CARE PLAN
The patient is Stable - Low risk of patient condition declining or worsening    Shift Goals  Clinical Goals: stress test  Patient Goals: DC home  Family Goals: not present    Progress made toward(s) clinical / shift goals:    Problem: Hemodynamics  Goal: Patient's hemodynamics, fluid balance and neurologic status will be stable or improve  Outcome: Progressing     Problem: Dysphagia  Goal: Dysphagia will improve  Outcome: Progressing       Patient is not progressing towards the following goals:

## 2023-01-01 NOTE — DISCHARGE SUMMARY
"Discharge Summary    CHIEF COMPLAINT ON ADMISSION  Chief Complaint   Patient presents with    Chest Pain     Intermittent chest pain x 1 hour. Endorses pain as \"sharp\" and \"stabbing.\" Endorses radiation of pain to left side of head and neck. Given 324 Aspirin with EMS. Endorses hx of HTN. Patient also endorses generalized weakness.        Reason for Admission  Chest Pain     Admission Date  12/30/2022    CODE STATUS  Prior    HPI & HOSPITAL COURSE  As per dr cates h+p    Gregory Ryan is a 57 y.o. male who presented 12/30/2022 with Chest Pain (Intermittent chest pain x 1 hour. Endorses pain as \"sharp\" and \"stabbing.\" Endorses radiation of pain to left side of head and neck. Given 324 Aspirin with EMS. Endorses hx of HTN. Patient also endorses generalized weakness. )  He is obese. He has a history of bad back hypertension, diabetes, hypothyroidism. He woke up today getting ready for work when he developed sharp stabbing chest pain 10/10 radiating to L side, head and neck accompanied by shortness of breath, diaphoresis and clamminess. Occurred for 1-2 hours then subsided at the ED. He has a family history of heart disease.    =================================================    Patient was monitored on telemetry.  No evidence arrhythmia..  His troponins were negative.  Patient underwent a Persantine thallium stress test did not show any evidence of ischemia.  Patient was cleared for discharge on 1231 to follow with PCP for the care and management    Therefore, he is discharged in good and stable condition to home with close outpatient follow-up.    The patient recovered much more quickly than anticipated on admission.    Discharge Date  12/31/2022    FOLLOW UP ITEMS POST DISCHARGE      DISCHARGE DIAGNOSES  Principal Problem (Resolved):    Other chest pain POA: Yes  Active Problems:    Primary hypertension POA: Yes    Class 1 obesity due to excess calories without serious comorbidity with body mass index (BMI) of " 33.0 to 33.9 in adult POA: Yes    Other specified hypothyroidism POA: Yes    Type 2 diabetes mellitus with hyperglycemia, without long-term current use of insulin (HCC) POA: Yes  Resolved Problems:    Chest pain POA: Yes      FOLLOW UP  No future appointments.  PCP    Schedule an appointment as soon as possible for a visit        MEDICATIONS ON DISCHARGE     Medication List        START taking these medications        Instructions   Aspirin Low Dose 81 MG EC tablet  Generic drug: aspirin   Take 1 Tablet by mouth every day.  Dose: 81 mg     atorvastatin 40 MG Tabs  Commonly known as: LIPITOR   Take 1 Tablet by mouth every evening.  Dose: 40 mg     nitroglycerin 0.4 MG Subl  Commonly known as: NITROSTAT   Place 1 Tablet under the tongue as needed for Chest Pain.  Dose: 0.4 mg            CONTINUE taking these medications        Instructions   ibuprofen 200 MG Tabs  Commonly known as: MOTRIN   Take 800 mg by mouth 1 time a day as needed (Pain).  Dose: 800 mg     levothyroxine 75 MCG Tabs  Commonly known as: SYNTHROID   Take 75 mcg by mouth every day.  Dose: 75 mcg     lisinopril 20 MG Tabs  Commonly known as: PRINIVIL   Take 20 mg by mouth every day.  Dose: 20 mg     metFORMIN 500 MG Tabs  Commonly known as: GLUCOPHAGE   Take 500 mg by mouth every day.  Dose: 500 mg              Allergies  No Known Allergies    DIET  No orders of the defined types were placed in this encounter.      ACTIVITY  As tolerated.  Weight bearing as tolerated    CONSULTATIONS      PROCEDURES                     Myocardial Perfusion   Report   NUCLEAR IMAGING INTERPRETATION   No significant reversible defect.       Borderline transient ischemic dilatation. Balanced 3 vessel ischemia cannot    be excluded.      Normal left ventricular size, ejection fraction, and wall motion.      JAMEL CAPUTO      MRN:    [id number]         Gender:    M      Exam Date: 12/31/2022 06:25      Exam Location:      Inpatient      Ordering Phys:     PARISA GROSS  O      NucMed Tech:       Baltazar DEAN                       (R,N)      Age:    57    :    1965        Ht (in):     68      Wt (lb):     215    BMI:    32.51       Radiologist      Risk Factors:             Family history of coronary disease, Obesity,   Smoking      Indications:              Other chest pain      ICD Codes:                R07.89      Cardiac History:          Positive risk factors, Dyspnea, Palpitations      Cardiac Meds:      Meds Past 24 hrs:      Pretest Chest Pain:       No symptoms      STRESS TEST      Pharmacologi                    [place]   Lexiscan       Dose: 0.4 mg   ol:                                 Post-Injection Exercise:        No exercise followed the intravenous   injection                     Resting HR (bpm):      75      Peak HR (bpm):         95      Resting BP (mmHg):       109    /   79      Peak BP (mmHg):       120   /   68      MaxPHR:     161     Target HR (bpm):       137      % MaxPHR:     59      Double Product:       [id number]      BP Response:      Stress Termination:       COMPLETED PROTOCOL      Stress Symptoms:   SOB,no chest discomfort,no significant changes noted      ECG      Resting ECG:      Stress ECG:      IMAGE PROTOCOL      Rest/Stress 1                        Day              RadiopharmaceuticalDose (mCi)   Imaging  Date      Imaging  Time           Inj to Img Time (min)   Rest:   Tc-99m             7.8          31-Dec-2022        08:44           Tetrofosmin   Stress: Tc-99m             27.9         31-Dec-2022        09:25           Tetrofosmin      Rest:   Administration Site:       Left forearm   Administered by:      Baltazar DEAN (R,N)      Stress:   Administration Site:       Left forearm   Administered by:      Baltazar DEAN (R,N)      % Percent HR Achieved:   SPECT RESULTS      Technical Quality:       Good      Raw Data Analysis:   Summed Stress Score:    2   Summed Rest Score:    1   Summed Difference Score:        2    PERFUSION:   No significant reversible defect. Borderline transient ischemic dilatation.      FUNCTIONAL RESULTS (calculated via Gated SPECT)      Stress Image LV EF:        76     %      Upper Normal Limit      Stress EDV:      66     ml   EDVI:    31      ml/m2      Stress ESV:      16     ml   ESVI:    8       ml/m2      TID:    1.18   TID - 1.19      TID (ed) - 1.23   LV Function:   Normal left ventricular wall motion.  LV ejection fraction = 76%.                           Patel Dormanoff   (Electronically Signed)   Final Date:      31 December 2022                     12:50           Specimen Collected: 12/31/22  6:25 AM Last Resulted: 12/31/22 12:51 PM       Order Details     View Encounter     Lab and Collection Details     Routing     Result History - Result Edited     View Encounter Conversation           Linked Documents     View SynapseCV Report      Scans on Order [id number]    Scan on 12/31/2022  9:14 AM by Baltazar Kilpatrick: Strikinglymed worksheet         Result Care Coordination      Patient Communication     Add Comments   Not seen Back to Top           NM-CARDIAC STRESS TEST [[id number]]    Electronically signed by: Jose Moe M.D. on 12/30/22 1004 Status: Completed   Ordering user: Jose Moe M.D. 12/30/22 1004 Ordering provider: Jose Moe M.D.   Authorized by: Jose Moe M.D. Ordered during: ED to Hosp-Admission (Discharged) on 12/30/2022    Add Signature Requirement   Frequency: Once 12/30/22 1005 - 1  occurrence   Questionnaire    Question Answer   Chemical or Treadmill? Chemical   Preliminary Diagnosis Other (please specify in comments)   Comments to Radiology chest pain   Release to patient Immediate   Order comments: Nothing to eat or drink for 4 hours prior to exam.  No caffeine for 24 hours prior to exam (decaf, coffee, cola, tea, chocolate, Excedrin, and Anacin).  No Viagra or other ED medications for 48 hours. If scheduled for Nuclear Medicine Treadmill-restrictions  apply for Beta-blockers for 48 hours. 3 negative troponins. Patent IV required. Screening/Acknowledgement for Stress test done in [place].     Reprint Order Requisition    NM-CARDIAC STRESS TEST (Order #[id number]) on 12/30/22     Linked Documents     View SynapseCV Report     Last Resulted Time   Sat Dec 31, 2022 12:51 PM       Procedure Documentation Timeline (1/1/2023 10:40:14 to 1/1/2023 10:40:14)    Reading Provider Reading Date   No Reading Provider Prelim Dec 31, 2022   Patel Valdez M.D. Dec 31, 2022     Signing Provider Signing Date Signing Time   Patel Valdez M.D. Dec 31, 2022 12:51 PM       Charges     Quantity   TC99M TETROFOSMIN(MYOVIEW)DOSE        LABORATORY  Lab Results   Component Value Date    SODIUM 137 12/31/2022    POTASSIUM 3.8 12/31/2022    CHLORIDE 104 12/31/2022    CO2 22 12/31/2022    GLUCOSE 120 (H) 12/31/2022    BUN 9 12/31/2022    CREATININE 0.70 12/31/2022        Lab Results   Component Value Date    WBC 7.9 12/31/2022    HEMOGLOBIN 15.9 12/31/2022    HEMATOCRIT 44.0 12/31/2022    PLATELETCT 239 12/31/2022        Total time of the discharge process exceeds 37  minutes.

## 2023-01-03 ENCOUNTER — TELEPHONE (OUTPATIENT)
Dept: SCHEDULING | Facility: IMAGING CENTER | Age: 58
End: 2023-01-03

## 2023-01-10 ENCOUNTER — OFFICE VISIT (OUTPATIENT)
Dept: MEDICAL GROUP | Facility: MEDICAL CENTER | Age: 58
End: 2023-01-10
Payer: COMMERCIAL

## 2023-01-10 ENCOUNTER — HOSPITAL ENCOUNTER (OUTPATIENT)
Facility: MEDICAL CENTER | Age: 58
End: 2023-01-10
Attending: NURSE PRACTITIONER
Payer: COMMERCIAL

## 2023-01-10 VITALS
BODY MASS INDEX: 33.04 KG/M2 | TEMPERATURE: 97 F | HEIGHT: 68 IN | SYSTOLIC BLOOD PRESSURE: 104 MMHG | WEIGHT: 218 LBS | HEART RATE: 84 BPM | DIASTOLIC BLOOD PRESSURE: 64 MMHG | OXYGEN SATURATION: 96 %

## 2023-01-10 DIAGNOSIS — F17.210 CIGARETTE NICOTINE DEPENDENCE WITHOUT COMPLICATION: ICD-10-CM

## 2023-01-10 DIAGNOSIS — Z12.11 SCREENING FOR COLORECTAL CANCER: ICD-10-CM

## 2023-01-10 DIAGNOSIS — E78.5 DYSLIPIDEMIA: ICD-10-CM

## 2023-01-10 DIAGNOSIS — R94.39 ABNORMAL STRESS TEST: ICD-10-CM

## 2023-01-10 DIAGNOSIS — E66.9 OBESITY (BMI 30-39.9): ICD-10-CM

## 2023-01-10 DIAGNOSIS — Z76.89 ENCOUNTER TO ESTABLISH CARE: ICD-10-CM

## 2023-01-10 DIAGNOSIS — B35.1 ONYCHOMYCOSIS OF MULTIPLE TOENAILS WITH TYPE 2 DIABETES MELLITUS (HCC): ICD-10-CM

## 2023-01-10 DIAGNOSIS — I10 ESSENTIAL HYPERTENSION: ICD-10-CM

## 2023-01-10 DIAGNOSIS — E11.65 TYPE 2 DIABETES MELLITUS WITH HYPERGLYCEMIA, WITHOUT LONG-TERM CURRENT USE OF INSULIN (HCC): ICD-10-CM

## 2023-01-10 DIAGNOSIS — E03.8 OTHER SPECIFIED HYPOTHYROIDISM: ICD-10-CM

## 2023-01-10 DIAGNOSIS — R07.89 OTHER CHEST PAIN: ICD-10-CM

## 2023-01-10 DIAGNOSIS — Z12.12 SCREENING FOR COLORECTAL CANCER: ICD-10-CM

## 2023-01-10 DIAGNOSIS — E11.69 ONYCHOMYCOSIS OF MULTIPLE TOENAILS WITH TYPE 2 DIABETES MELLITUS (HCC): ICD-10-CM

## 2023-01-10 PROCEDURE — 99000 SPECIMEN HANDLING OFFICE-LAB: CPT | Performed by: NURSE PRACTITIONER

## 2023-01-10 PROCEDURE — 82570 ASSAY OF URINE CREATININE: CPT

## 2023-01-10 PROCEDURE — 92250 FUNDUS PHOTOGRAPHY W/I&R: CPT | Mod: TC | Performed by: NURSE PRACTITIONER

## 2023-01-10 PROCEDURE — 82043 UR ALBUMIN QUANTITATIVE: CPT

## 2023-01-10 PROCEDURE — 99204 OFFICE O/P NEW MOD 45 MIN: CPT | Performed by: NURSE PRACTITIONER

## 2023-01-10 ASSESSMENT — PATIENT HEALTH QUESTIONNAIRE - PHQ9: CLINICAL INTERPRETATION OF PHQ2 SCORE: 0

## 2023-01-10 ASSESSMENT — FIBROSIS 4 INDEX: FIB4 SCORE: 1.19

## 2023-01-10 NOTE — ASSESSMENT & PLAN NOTE
Diagnosed about 6 years ago.  Taking Metformin 500mg BID.   Did not tolerate higher dose.   Not currently checking BS  Due for eye exams-  Reports nail fungus-ongoing x 30 years  Denies neuropathy.   Taking Aspirin 81mg.   a1c 7.3% on recent ER admit.

## 2023-01-10 NOTE — ASSESSMENT & PLAN NOTE
Recently seen in ER for CP.     Stress test: 12/31/2022  NUCLEAR IMAGING INTERPRETATION   No significant reversible defect.       Borderline transient ischemic dilatation. Balanced 3 vessel ischemia cannot    be excluded.      Normal left ventricular size, ejection fraction, and wall motion.   ECG INTERPRETATION   Negative stress ECG for ischemia.    ECHO:12/30/2022  CONCLUSIONS  No prior study is available for comparison.   The left ventricular ejection fraction is visually estimated to be 60%.  Normal regional wall motion.  No significant valvular disease.   Since discharge took one Nitro-

## 2023-01-10 NOTE — ASSESSMENT & PLAN NOTE
Diagnosed around age 33.   On lisinopril 20 mg.   Denies CP, SOB or dizziness, no headache.   +tobacco use  Checks BP at home: 118//90's

## 2023-01-10 NOTE — PROGRESS NOTES
Chief Complaint   Patient presents with    Establish Care     Prior PCP [facility]          Gregory Ryan is a 57 y.o. male here to establish care and to discuss the evaluation and management of:    Type 2 diabetes mellitus with hyperglycemia, without long-term current use of insulin (HCC)  Diagnosed about 6 years ago.  Taking Metformin 500mg BID. (Has not been on medication for some time)  Did not tolerate higher dose.   Not currently checking BS  Due for eye exams-  Reports nail fungus-ongoing x 30 years  Denies neuropathy.   Taking Aspirin 81mg.   a1c 7.3% on recent ER admit.  Due for micro.    Other specified hypothyroidism  Diagnosed at age 28.  Taking Levothyroxine 75 mcg daily.  Feels euthyroid   On same dose since he was diagnosed.   Due for TSH    Essential hypertension  Diagnosed around age 33.   On lisinopril 20 mg.   Denies CP, SOB or dizziness, no headache.   +tobacco use  Checks BP at home: 118//90's    Other chest pain  Recently seen in ER for CP.     Stress test: 12/31/2022  NUCLEAR IMAGING INTERPRETATION   No significant reversible defect.       Borderline transient ischemic dilatation. Balanced 3 vessel ischemia cannot    be excluded.      Normal left ventricular size, ejection fraction, and wall motion.   ECG INTERPRETATION   Negative stress ECG for ischemia.    ECHO:12/30/2022  CONCLUSIONS  No prior study is available for comparison.   The left ventricular ejection fraction is visually estimated to be 60%.  Normal regional wall motion.  No significant valvular disease.   Since discharge took one Nitro-    Dyslipidemia  Recently just started on statin after ER visit for CP.  Tolerating well. No myalgias at this time.   On Atorvastatin 40mg.        Had Cologuard 5 years ago -reports negative     Chart reviewed    ROS: SEE ABOVE        Current Outpatient Medications:     aspirin 81 MG EC tablet, Take 1 Tablet by mouth every day., Disp: 100 Tablet, Rfl: 0    atorvastatin  (LIPITOR) 40 MG Tab, Take 1 Tablet by mouth every evening., Disp: 30 Tablet, Rfl: 3    nitroglycerin (NITROSTAT) 0.4 MG SL Tab, Place 1 Tablet under the tongue as needed for Chest Pain., Disp: 25 Tablet, Rfl: 3    ibuprofen (MOTRIN) 200 MG Tab, Take 800 mg by mouth 1 time a day as needed (Pain)., Disp: , Rfl:     metformin (GLUCOPHAGE) 500 MG TABS, Take 500 mg by mouth every day., Disp: , Rfl:     lisinopril (PRINIVIL) 20 MG TABS, Take 20 mg by mouth every day., Disp: , Rfl:     levothyroxine (SYNTHROID) 75 MCG TABS, Take 75 mcg by mouth every day., Disp: , Rfl:     No Known Allergies    Past Medical History:   Diagnosis Date    Arthritis     Diabetes     Hyperlipidemia     Hypertension     Hypothyroid      History reviewed. No pertinent surgical history.  Family History   Problem Relation Age of Onset    Cancer Mother         breast mets to lungs/brain    Stroke Father     Heart Disease Father     Cancer Maternal Aunt      Social History     Socioeconomic History    Marital status: Other     Spouse name: Not on file    Number of children: Not on file    Years of education: Not on file    Highest education level: Not on file   Occupational History    Not on file   Tobacco Use    Smoking status: Every Day     Packs/day: 1.00     Years: 8.00     Pack years: 8.00     Types: Cigarettes    Smokeless tobacco: Not on file    Tobacco comments:     1 ppd 40 until 1/1/2023-reduced dramatically    Substance and Sexual Activity    Alcohol use: Yes     Comment: occ    Drug use: No    Sexual activity: Not on file   Other Topics Concern    Not on file   Social History Narrative    Not on file     Social Determinants of Health     Financial Resource Strain: Not on file   Food Insecurity: Not on file   Transportation Needs: Not on file   Physical Activity: Not on file   Stress: Not on file   Social Connections: Not on file   Intimate Partner Violence: Not on file   Housing Stability: Not on file       Objective:     Vitals: BP  "104/64 (BP Location: Right arm, Patient Position: Sitting, BP Cuff Size: Adult)   Pulse 84   Temp 36.1 °C (97 °F) (Temporal)   Ht 1.727 m (5' 8\")   Wt 98.9 kg (218 lb)   SpO2 96%   BMI 33.15 kg/m²      General: Alert, pleasant, NAD  HEENT:  Normocephalic.  Neck supple.  No thyromegaly or masses palpated. No cervical or supraclavicular lymphadenopathy.  Heart:  Regular rate and rhythm.  S1 and S2 normal.  No murmurs appreciated.    Respiratory:  Normal respiratory effort.  Clear to auscultation bilaterally.    Skin:  Warm, dry, no rashes  Musculoskeletal:  Gait is normal.  Moves all extremities well.  Extremities:   No leg edema. Bilateral thick, overgrown yellowed, disfigured toe nails.  Neurological: No tremors  Psych:  Affect/mood is normal, judgement is good, memory is intact, grooming is appropriate.      Assessment and Plan.     57 y.o. male to establish care and discuss the followin. Type 2 diabetes mellitus with hyperglycemia, without long-term current use of insulin (HCC)  Chronic problem for patient, new problem to me.  Not well controlled this time as he was out of his medication.  Consider adding on Jardiance for cardiovascular benefit.  Continue metformin for now.  Follow-up in 2 weeks.  - MICROALBUMIN CREAT RATIO URINE; Future  - POCT Retinal Eye Exam    2. Onychomycosis of multiple toenails with type 2 diabetes mellitus (HCC)  Ongoing problem for patient. No hx of treatment. New problem to me.  Consider referral to podiatry.    3. Other chest pain  Not having any chest pain at this time.  He does have nitro as needed.  Recommend follow-up with cardiology given his longstanding history of tobacco use, dyslipidemia, family history of heart disease, diabetes as well as his stress test as there was transient ischemic dilatation. Continue statin, aspirin for now.   - REFERRAL TO CARDIOLOGY    4. Abnormal stress test  Recommend evaluation with cardiology based on findings on recent stress test. "  Continue statin and aspirin.  - REFERRAL TO CARDIOLOGY    5. Dyslipidemia  Chronic. Recently started on Atorvastatin. LDL not at goal of <70.  Continue Atorvastatin and diet changes.  We will recheck labs in 3 months.   Pan to order labs at next office visit.  - REFERRAL TO CARDIOLOGY    6. Essential hypertension  New problem to me, stable on current medication.  Continue to encourage patient to quit smoking.  Continue to check BP at home.  - MICROALBUMIN CREAT RATIO URINE; Future    7. Other specified hypothyroidism  Chronic problem for the patient.  New problem to me. I do not have access to previous labs. Due for TSH labs. Continue current dose for now. Will order labs at next OV.    8. Obesity (BMI 30-39.9)  - Patient identified as having weight management issue.  Appropriate orders and counseling given.    9. Screening for colorectal cancer  - COLOGUARD (FIT DNA)    10. Encounter to establish care    -requesting records.   -follow up 2 weeks  -plan to order labs then  -discuss vaccines at next OV.  Declines flu    11.  Cigarette nicotine dependence without complication  From 1 pack/day down to quarter pack since ER visit.  Continue to encourage patient to reduce cigarette use.  We will discuss possible evaluation for CT lung cancer screening at next office visit in 2 weeks.    Return in about 2 weeks (around 1/24/2023).    {I have placed the above orders and discussed them with an approved delegating provider.  The MA is performing the below orders under the direction of Dr. July BRYANT

## 2023-01-10 NOTE — ASSESSMENT & PLAN NOTE
Diagnosed at age 28.  Taking Levothyroxine 75 mcg daily.  Feels euthyroid   Same dose since he was diagnosed.

## 2023-01-10 NOTE — LETTER
January 10, 2023        Gregory Ryan  [address] [place] [address]      To whom it may concern:    Mr. Gregory Ryan is a patient under my direct medical care at this facility. He is cleared to return to work starting on January 11th, 2023 without restrictions.         If you have any questions or concerns, please don't hesitate to call.        Sincerely,        SHELIA Holt.    Electronically Signed

## 2023-01-10 NOTE — ASSESSMENT & PLAN NOTE
Recently just started on statin after ER visit for CP.  Tolerating well. No myalgias at this time.   On Atorvastatin 40mg.

## 2023-01-10 NOTE — LETTER
[facility]  LEONELA HoltPKemalRYUNI.  [address] [facility] [address]  [place] [address]  Fax: [phone]   Authorization for Release/Disclosure of   Protected Health Information   Name: JAMEL CAPUTO : 1965 SSN: [id number]   Address: [address] [place] [address] [place] [address] Phone:    [phone] (home) [phone] (work)   I authorize the entity listed below to release/disclose the PHI below to:   [facility]/UDAY Holt.P.RVASILE and TAM HoltRVSAILE   Provider or Entity Name:  Dr. Daniel Nick   Address   [place], [facility], [facility]   Phone:      Fax:     Reason for request: continuity of care   Information to be released:    [  ] LAST COLONOSCOPY,  including any PATH REPORT and follow-up  [  ] LAST FIT/COLOGUARD RESULT [  ] LAST DEXA  [  ] LAST MAMMOGRAM  [  ] LAST PAP  [  ] LAST LABS [  ] RETINA EXAM REPORT  [  ] IMMUNIZATION RECORDS  [ x ] Release all info      [  ] Check here and initial the line next to each item to release ALL health information INCLUDING  _____ Care and treatment for drug and / or alcohol abuse  _____ HIV testing, infection status, or AIDS  _____ Genetic Testing    DATES OF SERVICE OR TIME PERIOD TO BE DISCLOSED: _____________  I understand and acknowledge that:  * This Authorization may be revoked at any time by you in writing, except if your health information has already been used or disclosed.  * Your health information that will be used or disclosed as a result of you signing this authorization could be re-disclosed by the recipient. If this occurs, your re-disclosed health information may no longer be protected by State or Federal laws.  * You may refuse to sign this Authorization. Your refusal will not affect your ability to obtain treatment.  * This Authorization becomes effective upon signing and will  on (date) __________.      If no date is indicated, this Authorization will  one (1) year from the signature date.    Name:  Gregory Ryan    Signature:   Date:     1/10/2023       PLEASE FAX REQUESTED RECORDS BACK TO: [phone]

## 2023-01-11 LAB
CREAT UR-MCNC: 226.46 MG/DL
MICROALBUMIN UR-MCNC: <1.2 MG/DL
MICROALBUMIN/CREAT UR: NORMAL MG/G (ref 0–30)

## 2023-01-16 ENCOUNTER — TELEPHONE (OUTPATIENT)
Dept: HEALTH INFORMATION MANAGEMENT | Facility: OTHER | Age: 58
End: 2023-01-16
Payer: COMMERCIAL

## 2023-06-02 ENCOUNTER — OFFICE VISIT (OUTPATIENT)
Dept: MEDICAL GROUP | Facility: MEDICAL CENTER | Age: 58
End: 2023-06-02
Payer: COMMERCIAL

## 2023-06-02 VITALS
WEIGHT: 215 LBS | BODY MASS INDEX: 32.58 KG/M2 | OXYGEN SATURATION: 96 % | DIASTOLIC BLOOD PRESSURE: 60 MMHG | SYSTOLIC BLOOD PRESSURE: 112 MMHG | TEMPERATURE: 97.7 F | HEART RATE: 75 BPM | HEIGHT: 68 IN

## 2023-06-02 DIAGNOSIS — E11.65 TYPE 2 DIABETES MELLITUS WITH HYPERGLYCEMIA, WITHOUT LONG-TERM CURRENT USE OF INSULIN (HCC): Chronic | ICD-10-CM

## 2023-06-02 DIAGNOSIS — L30.9 DERMATITIS: ICD-10-CM

## 2023-06-02 DIAGNOSIS — E78.5 DYSLIPIDEMIA: ICD-10-CM

## 2023-06-02 DIAGNOSIS — R07.9 CHEST PAIN, UNSPECIFIED TYPE: ICD-10-CM

## 2023-06-02 DIAGNOSIS — R94.39 ABNORMAL STRESS TEST: ICD-10-CM

## 2023-06-02 DIAGNOSIS — Z11.59 ENCOUNTER FOR HEPATITIS C SCREENING TEST FOR LOW RISK PATIENT: ICD-10-CM

## 2023-06-02 DIAGNOSIS — E03.8 OTHER SPECIFIED HYPOTHYROIDISM: ICD-10-CM

## 2023-06-02 DIAGNOSIS — I10 ESSENTIAL HYPERTENSION: ICD-10-CM

## 2023-06-02 LAB
HBA1C MFR BLD: 7.5 % (ref ?–5.8)
POCT INT CON NEG: NEGATIVE
POCT INT CON POS: POSITIVE

## 2023-06-02 PROCEDURE — 83036 HEMOGLOBIN GLYCOSYLATED A1C: CPT | Performed by: NURSE PRACTITIONER

## 2023-06-02 PROCEDURE — 99214 OFFICE O/P EST MOD 30 MIN: CPT | Performed by: NURSE PRACTITIONER

## 2023-06-02 PROCEDURE — 3078F DIAST BP <80 MM HG: CPT | Performed by: NURSE PRACTITIONER

## 2023-06-02 PROCEDURE — 3074F SYST BP LT 130 MM HG: CPT | Performed by: NURSE PRACTITIONER

## 2023-06-02 RX ORDER — LEVOTHYROXINE SODIUM 0.07 MG/1
75 TABLET ORAL DAILY
Qty: 90 TABLET | Refills: 3 | Status: SHIPPED | OUTPATIENT
Start: 2023-06-02

## 2023-06-02 RX ORDER — TRIAMCINOLONE ACETONIDE 1 MG/G
1 CREAM TOPICAL 2 TIMES DAILY
Qty: 30 G | Refills: 1 | Status: SHIPPED | OUTPATIENT
Start: 2023-06-02 | End: 2023-06-12

## 2023-06-02 RX ORDER — EMPAGLIFLOZIN 25 MG/1
25 TABLET, FILM COATED ORAL DAILY
Qty: 90 TABLET | Refills: 3 | Status: SHIPPED | OUTPATIENT
Start: 2023-06-02

## 2023-06-02 RX ORDER — ATORVASTATIN CALCIUM 40 MG/1
40 TABLET, FILM COATED ORAL NIGHTLY
Qty: 90 TABLET | Refills: 3 | Status: SHIPPED | OUTPATIENT
Start: 2023-06-02

## 2023-06-02 RX ORDER — LISINOPRIL 20 MG/1
20 TABLET ORAL DAILY
Qty: 90 TABLET | Refills: 3 | Status: SHIPPED | OUTPATIENT
Start: 2023-06-02

## 2023-06-02 ASSESSMENT — FIBROSIS 4 INDEX: FIB4 SCORE: 1.19

## 2023-06-02 NOTE — PROGRESS NOTES
"Chief Complaint   Patient presents with    Diabetes Follow-up     Subjective:     HPI:     Gregory Ryan is a 57 y.o. male here to discuss the following:    Follow up last OV 1/2023    Diabetes  Taking Metformin 500mg BID. Did not tolerate higher dose.   Not currently checking BS  A1c 7.5% today in clinic. Last A1c 7.3%     Abnormal stress test  Has not followed up with Cardiology yet. Tells me he is not having CP.   However have reviewed his abnormal stress test and recommend eval with Cardiology. Referral is pending--have provided referral information.     \"Borderline transient ischemic dilatation. Balanced 3 vessel ischemia cannot be excluded.\"    Dermatitis  Has skin irritation on wrist ongoing for a few months. No pain. Itching   Tried neosporin but not improving.     Declines vaccines today.      ROS: : see above        Current Outpatient Medications:     atorvastatin (LIPITOR) 40 MG Tab, Take 1 Tablet by mouth every evening., Disp: 90 Tablet, Rfl: 3    levothyroxine (SYNTHROID) 75 MCG Tab, Take 1 Tablet by mouth every day., Disp: 90 Tablet, Rfl: 3    lisinopril (PRINIVIL) 20 MG Tab, Take 1 Tablet by mouth every day., Disp: 90 Tablet, Rfl: 3    metFORMIN (GLUCOPHAGE) 500 MG Tab, Take 1 Tablet by mouth every day., Disp: 90 Tablet, Rfl: 3    Empagliflozin (JARDIANCE) 25 MG Tab, Take 25 mg by mouth every day., Disp: 90 Tablet, Rfl: 3    triamcinolone acetonide (KENALOG) 0.1 % Cream, Apply 1 Application topically 2 times a day for 10 days., Disp: 30 g, Rfl: 1    aspirin 81 MG EC tablet, Take 1 Tablet by mouth every day., Disp: 100 Tablet, Rfl: 0    nitroglycerin (NITROSTAT) 0.4 MG SL Tab, Place 1 Tablet under the tongue as needed for Chest Pain., Disp: 25 Tablet, Rfl: 3    ibuprofen (MOTRIN) 200 MG Tab, Take 800 mg by mouth 1 time a day as needed (Pain)., Disp: , Rfl:     No Known Allergies    Objective:     Vitals: /60 (BP Location: Right arm, Patient Position: Sitting, BP Cuff Size: Adult)   " "Pulse 75   Temp 36.5 °C (97.7 °F) (Temporal)   Ht 1.727 m (5' 8\")   Wt 97.5 kg (215 lb)   SpO2 96%   BMI 32.69 kg/m²    General: Alert, pleasant, NAD  HEENT: Normocephalic.  Neck supple.   Respiratory: no distress, no audible wheezing, RR -WNL  Skin: Warm, dry, dry erythematous patches on left wrist.   Extremities: No leg edema. No discoloration  Neurological: No tremors  Psych:  Affect/mood is normal, judgement is good, memory is intact, grooming is appropriate.    Assessment/Plan:      1. Type 2 diabetes mellitus with hyperglycemia, without long-term current use of insulin (HCC)  Chronic. Not controlled. A1c 7.5%. Continue Metformin and add on Jardiance 25mg daily. Have informed patient to remain adequately hydrated and monitor for genitourinary infections that may occur. Follow up 4 months. Recheck A1c in clinic.   - metFORMIN (GLUCOPHAGE) 500 MG Tab; Take 1 Tablet by mouth every day.  Dispense: 90 Tablet; Refill: 3  - POCT Hemoglobin A1C  - Empagliflozin (JARDIANCE) 25 MG Tab; Take 25 mg by mouth every day.  Dispense: 90 Tablet; Refill: 3  - Comp Metabolic Panel; Future  - Lipid Profile; Future  - MICROALBUMIN CREAT RATIO URINE; Future    2. Essential hypertension  Chronic. Stable on Lisinopril. No CP, palpitations, dizziness. Recommend tobacco cessation.  Update labs prior to next OV.  - lisinopril (PRINIVIL) 20 MG Tab; Take 1 Tablet by mouth every day.  Dispense: 90 Tablet; Refill: 3  - Comp Metabolic Panel; Future  - MICROALBUMIN CREAT RATIO URINE; Future    3. Dyslipidemia  Chronic. No myalgias. Goal LDL <70.   Continue Atorvastatin.   Update labs prior to next OV.  - atorvastatin (LIPITOR) 40 MG Tab; Take 1 Tablet by mouth every evening.  Dispense: 90 Tablet; Refill: 3  - Lipid Profile; Future    4. Other specified hypothyroidism  Chronic. Stable on current Levothyroxine dose.   Refills provided.   Update labs prior to next OV  - levothyroxine (SYNTHROID) 75 MCG Tab; Take 1 Tablet by mouth every day.  " Dispense: 90 Tablet; Refill: 3  - TSH; Future    5. Chest pain, unspecified type  6. Abnormal stress test  Has not followed up with Cardiology yet.   Recommend following up with referral. Provided information for him to     7. Dermatitis  Acute. Localized to left wrist. Trial of Aquaphor daily to help heal the skin, avoid scratching. Trial of Triamcinolone cream. Monitor.   - triamcinolone acetonide (KENALOG) 0.1 % Cream; Apply 1 Application topically 2 times a day for 10 days.  Dispense: 30 g; Refill: 1    8. Encounter for hepatitis C screening test for low risk patient  - HEP C VIRUS ANTIBODY; Future      Return in about 4 months (around 10/2/2023) for Lab results.    {I have placed the above orders and discussed them with an approved delegating provider. The MA is performing the below orders under the direction of Dr. July BRYANT

## 2023-06-02 NOTE — PATIENT INSTRUCTIONS
Call to schedule with Cardiology.     [facility]       [address], [address]  [place] [address]  Phone: [phone]

## 2023-06-04 PROBLEM — E66.09 CLASS 1 OBESITY DUE TO EXCESS CALORIES WITHOUT SERIOUS COMORBIDITY WITH BODY MASS INDEX (BMI) OF 33.0 TO 33.9 IN ADULT: Status: RESOLVED | Noted: 2022-12-30 | Resolved: 2023-06-04

## 2023-06-04 PROBLEM — B35.1 ONYCHOMYCOSIS OF MULTIPLE TOENAILS WITH TYPE 2 DIABETES MELLITUS (HCC): Chronic | Status: ACTIVE | Noted: 2023-01-10

## 2023-06-04 PROBLEM — E66.9 OBESITY (BMI 30-39.9): Chronic | Status: ACTIVE | Noted: 2023-01-10

## 2023-06-04 PROBLEM — E78.5 DYSLIPIDEMIA: Chronic | Status: ACTIVE | Noted: 2023-01-10

## 2023-06-04 PROBLEM — F17.210 CIGARETTE NICOTINE DEPENDENCE WITHOUT COMPLICATION: Chronic | Status: ACTIVE | Noted: 2023-01-10

## 2023-06-04 PROBLEM — E11.69 ONYCHOMYCOSIS OF MULTIPLE TOENAILS WITH TYPE 2 DIABETES MELLITUS (HCC): Chronic | Status: ACTIVE | Noted: 2023-01-10

## 2023-09-26 ENCOUNTER — TELEPHONE (OUTPATIENT)
Dept: MEDICAL GROUP | Facility: MEDICAL CENTER | Age: 58
End: 2023-09-26
Payer: COMMERCIAL

## 2023-09-26 NOTE — TELEPHONE ENCOUNTER
Pt called and Lvm stating that he was exposed to COVID on Saturday. Pt requesting COVID medication     Called pt and LVM for pt to go to Urgent Care for further evaluation.

## 2024-07-09 ENCOUNTER — OFFICE VISIT (OUTPATIENT)
Dept: URGENT CARE | Facility: CLINIC | Age: 59
End: 2024-07-09
Payer: COMMERCIAL

## 2024-07-09 VITALS
OXYGEN SATURATION: 96 % | HEART RATE: 70 BPM | SYSTOLIC BLOOD PRESSURE: 118 MMHG | RESPIRATION RATE: 16 BRPM | HEIGHT: 68 IN | TEMPERATURE: 97.4 F | DIASTOLIC BLOOD PRESSURE: 78 MMHG | BODY MASS INDEX: 32.14 KG/M2 | WEIGHT: 212.08 LBS

## 2024-07-09 DIAGNOSIS — K13.79 UVULAR SWELLING: ICD-10-CM

## 2024-07-09 DIAGNOSIS — J02.9 SORE THROAT: ICD-10-CM

## 2024-07-09 PROCEDURE — 99213 OFFICE O/P EST LOW 20 MIN: CPT | Performed by: FAMILY MEDICINE

## 2024-07-09 PROCEDURE — 3078F DIAST BP <80 MM HG: CPT | Performed by: FAMILY MEDICINE

## 2024-07-09 PROCEDURE — 3074F SYST BP LT 130 MM HG: CPT | Performed by: FAMILY MEDICINE

## 2024-07-09 RX ORDER — AMOXICILLIN AND CLAVULANATE POTASSIUM 875; 125 MG/1; MG/1
1 TABLET, FILM COATED ORAL 2 TIMES DAILY
Qty: 20 TABLET | Refills: 0 | Status: SHIPPED | OUTPATIENT
Start: 2024-07-09 | End: 2024-07-19

## 2024-07-09 RX ORDER — LIDOCAINE HYDROCHLORIDE 20 MG/ML
5 SOLUTION OROPHARYNGEAL EVERY 4 HOURS PRN
Qty: 100 ML | Refills: 0 | Status: SHIPPED | OUTPATIENT
Start: 2024-07-09

## 2024-07-09 ASSESSMENT — FIBROSIS 4 INDEX: FIB4 SCORE: 1.21

## 2024-07-09 ASSESSMENT — ENCOUNTER SYMPTOMS
SORE THROAT: 1
COUGH: 1
CARDIOVASCULAR NEGATIVE: 1
EYES NEGATIVE: 1